# Patient Record
Sex: FEMALE
[De-identification: names, ages, dates, MRNs, and addresses within clinical notes are randomized per-mention and may not be internally consistent; named-entity substitution may affect disease eponyms.]

---

## 2024-02-21 PROBLEM — Z00.00 ENCOUNTER FOR PREVENTIVE HEALTH EXAMINATION: Status: ACTIVE | Noted: 2024-02-21

## 2024-02-29 ENCOUNTER — APPOINTMENT (OUTPATIENT)
Dept: GYNECOLOGIC ONCOLOGY | Facility: CLINIC | Age: 54
End: 2024-02-29

## 2024-03-07 ENCOUNTER — APPOINTMENT (OUTPATIENT)
Dept: GYNECOLOGIC ONCOLOGY | Facility: CLINIC | Age: 54
End: 2024-03-07
Payer: MEDICAID

## 2024-03-07 VITALS
SYSTOLIC BLOOD PRESSURE: 146 MMHG | DIASTOLIC BLOOD PRESSURE: 91 MMHG | BODY MASS INDEX: 34.53 KG/M2 | WEIGHT: 220 LBS | HEIGHT: 67 IN

## 2024-03-07 DIAGNOSIS — Z80.3 FAMILY HISTORY OF MALIGNANT NEOPLASM OF BREAST: ICD-10-CM

## 2024-03-07 PROCEDURE — 99204 OFFICE O/P NEW MOD 45 MIN: CPT

## 2024-03-07 NOTE — PAST MEDICAL HISTORY
[Postmenopausal] : The patient is postmenopausal [Menopause Age____] : age at menopause was [unfilled] [Definite ___ (Date)] : the last menstrual period was [unfilled] [AB Spont ___] : miscarriages: [unfilled]  [Total Preg ___] : G[unfilled]

## 2024-03-07 NOTE — REVIEW OF SYSTEMS
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

## 2024-03-07 NOTE — OB HISTORY
[Total Preg ___] : : [unfilled] [Living ___] : [unfilled] (living) [AB Spont ___] : [unfilled] miscarriage(s) [Definite ___ (Date)] : the last menstrual period was [unfilled]

## 2024-03-07 NOTE — ASSESSMENT
[FreeTextEntry1] : 53-year-old female para 0 with complaint of abdominal bloating and pelvic pain found to have large ovarian/pelvic mass.  Given the patient's complaint, patient was referred for consultation.  I discussed with patient differential diagnosis of the pelvic mass coupled with her complaints of bloating, malignancy of the ovary must be ruled out.  Explained to the patient most pelvic masses are benign however surgery is the only way to rule out invasive cancer of the ovary.  Prior to embarking on any surgical management, I recommend for the patient to undergo CT scan of the abdomen and pelvis with p.o. and IV contrast. Patient has ultrasound performed by her referring physician indicates the finding of a pelvic mass. Patient understand and agree with the plan.

## 2024-03-07 NOTE — PHYSICAL EXAM
[Abnormal] : Bimanual Exam: Abnormal [Normal] : Recto-Vaginal Exam: Normal [de-identified] : Soft, nontender, nondistended surgical incision scar noted as a result of myomectomy [de-identified] : GYN examination is limited due to body habitus, no masses palpated however discomfort appreciated on bimanual examination [Fully active, able to carry on all pre-disease performance without restriction] : Status 0 - Fully active, able to carry on all pre-disease performance without restriction

## 2024-03-07 NOTE — CHIEF COMPLAINT
[Other: _____] : [unfilled] [FreeTextEntry1] : 51-year-old female, para 0 with abdominal bloating and discomfort found to have pelvic mass, patient was referred for management.

## 2024-03-07 NOTE — HISTORY OF PRESENT ILLNESS
[FreeTextEntry1] : 53-year-old female para 0 with abdominal bloating and pelvic discomfort, patient found to have large pelvic mass appears to radiate from the ovary, patient was referred to rule out malignancy of the ovary.

## 2024-04-01 ENCOUNTER — NON-APPOINTMENT (OUTPATIENT)
Age: 54
End: 2024-04-01